# Patient Record
Sex: FEMALE | Race: OTHER | NOT HISPANIC OR LATINO | URBAN - METROPOLITAN AREA
[De-identification: names, ages, dates, MRNs, and addresses within clinical notes are randomized per-mention and may not be internally consistent; named-entity substitution may affect disease eponyms.]

---

## 2017-03-04 ENCOUNTER — OUTPATIENT (OUTPATIENT)
Dept: OUTPATIENT SERVICES | Facility: HOSPITAL | Age: 27
LOS: 1 days | Discharge: HOME | End: 2017-03-04

## 2017-06-27 DIAGNOSIS — K76.9 LIVER DISEASE, UNSPECIFIED: ICD-10-CM

## 2017-06-27 DIAGNOSIS — R79.9 ABNORMAL FINDING OF BLOOD CHEMISTRY, UNSPECIFIED: ICD-10-CM

## 2017-06-27 DIAGNOSIS — E03.9 HYPOTHYROIDISM, UNSPECIFIED: ICD-10-CM

## 2017-06-27 DIAGNOSIS — Z21 ASYMPTOMATIC HUMAN IMMUNODEFICIENCY VIRUS [HIV] INFECTION STATUS: ICD-10-CM

## 2017-06-27 DIAGNOSIS — E78.2 MIXED HYPERLIPIDEMIA: ICD-10-CM

## 2017-06-27 DIAGNOSIS — D64.9 ANEMIA, UNSPECIFIED: ICD-10-CM

## 2017-06-27 DIAGNOSIS — E11.9 TYPE 2 DIABETES MELLITUS WITHOUT COMPLICATIONS: ICD-10-CM

## 2022-03-04 ENCOUNTER — TRANSCRIPTION ENCOUNTER (OUTPATIENT)
Age: 32
End: 2022-03-04

## 2022-03-06 ENCOUNTER — TRANSCRIPTION ENCOUNTER (OUTPATIENT)
Age: 32
End: 2022-03-06

## 2022-03-08 ENCOUNTER — TRANSCRIPTION ENCOUNTER (OUTPATIENT)
Age: 32
End: 2022-03-08

## 2022-06-01 ENCOUNTER — NON-APPOINTMENT (OUTPATIENT)
Age: 32
End: 2022-06-01

## 2022-11-15 ENCOUNTER — NON-APPOINTMENT (OUTPATIENT)
Age: 32
End: 2022-11-15

## 2024-04-29 ENCOUNTER — APPOINTMENT (OUTPATIENT)
Dept: ORTHOPEDIC SURGERY | Facility: CLINIC | Age: 34
End: 2024-04-29
Payer: COMMERCIAL

## 2024-04-29 DIAGNOSIS — M54.6 PAIN IN THORACIC SPINE: ICD-10-CM

## 2024-04-29 DIAGNOSIS — M54.2 CERVICALGIA: ICD-10-CM

## 2024-04-29 DIAGNOSIS — M54.50 LOW BACK PAIN, UNSPECIFIED: ICD-10-CM

## 2024-04-29 PROBLEM — Z00.00 ENCOUNTER FOR PREVENTIVE HEALTH EXAMINATION: Status: ACTIVE | Noted: 2024-04-29

## 2024-04-29 PROCEDURE — 99203 OFFICE O/P NEW LOW 30 MIN: CPT

## 2024-04-29 PROCEDURE — 72083 X-RAY EXAM ENTIRE SPI 4/5 VW: CPT

## 2024-04-29 NOTE — HISTORY OF PRESENT ILLNESS
[de-identified] : 33-year-old female presents to me with many years of pain in her neck and low back as well as her left scapula where she feels a patch of numbness.  No significant pain shooting down her arms or legs no loss of bladder or bowel.  The patient has not treated this anywhere she has had no imaging she has not done any physical therapy.

## 2024-04-29 NOTE — IMAGING
[de-identified] : TTP midline cervical spine and paraspinal musculature   Strength                                                                     Deltoid   Right: 5/5; Left: 5/5                      Biceps   Right: 5/5; Left: 5/5                   Triceps        Right: 5/5; Left: 5/5                                 Wrist Extensors     Right: 5/5; Left: 5/5 Finger Flexors     Right: 5/5; Left: 5/5 IO    Right: 5/5; Left: 5/5  Sensation C5   Right: 2/2; Left: 2/2 C6   Right: 2/2; Left: 2/2 C7   Right: 2/2; Left: 2/2 C8   Right: 2/2; Left: 2/2 T1   Right: 2/2; Left: 2/2  Reflexes Biceps   Right: 2+; Left 2+ Triceps   Right: 2+; Left 2+ Lyons's  Right: Negative; L: Negative  Patient is able to fully bend and extend.

## 2024-04-29 NOTE — DISCUSSION/SUMMARY
[de-identified] : 33-year-old female presents to me with pain in her thoracic spine and lumbar spine.  I am recommending physical therapy.  If that fails she should follow-up to discuss trigger point injections or more invasive style injections with interventional pain management.

## 2024-04-29 NOTE — DATA REVIEWED
[[email or web address]] : Obtained and reviewed AP and lateral scoliosis x-rays.  No fractures no dislocations no instability no scoliosis

## 2025-04-21 ENCOUNTER — NON-APPOINTMENT (OUTPATIENT)
Age: 35
End: 2025-04-21